# Patient Record
Sex: FEMALE | Race: WHITE | HISPANIC OR LATINO | Employment: FULL TIME | URBAN - METROPOLITAN AREA
[De-identification: names, ages, dates, MRNs, and addresses within clinical notes are randomized per-mention and may not be internally consistent; named-entity substitution may affect disease eponyms.]

---

## 2024-02-05 ENCOUNTER — NURSE TRIAGE (OUTPATIENT)
Dept: ADMINISTRATIVE | Facility: CLINIC | Age: 39
End: 2024-02-05
Payer: COMMERCIAL

## 2024-02-06 NOTE — TELEPHONE ENCOUNTER
"Spoke with pt who reports that she is 31 weeks pregnant, reports that while at work she noticed leakage of fluid from vagina. States that this incident occurred 2 times while at work today. Pt. head congestion, and that she is seeing floater. Took BP while on phone with NT JOSE ANGEL is 134/71. States she took BP prior to calling, and BP' were noted to be 144/77, then 129/70. Pt advised to be seen in L&D. Verbalized understanding      Reason for Disposition   [1] Pregnant 20 or more weeks AND [2] new blurred vision or vision changes    Additional Information   Negative: Difficult to awaken or acting confused (e.g., disoriented, slurred speech)   Negative: [1] Weakness of the face, arm or leg on one side of the body AND [2] new-onset   Negative: [1] Numbness of the face, arm or leg on one side of the body AND [2] new-onset   Negative: [1] Loss of speech or garbled speech AND [2] new-onset   Negative: Sounds like a life-threatening emergency to the triager   Negative: Unable to walk, or can only walk with assistance (e.g., requires support)   Negative: Severe pain in one eye   Negative: Stiff neck (can't touch chin to chest)   Negative: [1] Other family members (or people in same household) with headaches AND [2] possibility of carbon monoxide exposure   Negative: [1] SEVERE headache (e.g., excruciating) AND [2] having contractions or other symptoms of labor   Negative: [1] Pregnant 20 or more weeks AND [2] Systolic BP >= 140 OR Diastolic BP >= 90   Negative: [1] SEVERE headache (e.g., excruciating) AND [2] "worst headache" of life   Negative: [1] SEVERE headache AND [2] sudden-onset (i.e., reaching maximum intensity within seconds to 1 hour)   Negative: [1] SEVERE headache AND [2] fever   Negative: Loss of vision or double vision  (Exception: Similar to previous migraines.)   Negative: [1] Fever > 100.0 F (37.8 C) AND [2] diabetes mellitus or weak immune system (e.g., HIV positive, cancer chemo, splenectomy, organ " transplant, chronic steroids)   Negative: Patient sounds very sick or weak to the triager   Negative: [1] Pregnant 20 or more weeks AND [2] SEVERE headache (e.g., excruciating) AND [3] not improved after 2 hours of pain medicine   Negative: [1] Pregnant 20 or more weeks AND [2] new hand or face swelling   Negative: [1] Pregnant 20 or more weeks AND [2] sudden weight gain (e.g., more than 3 lbs or 1.4 kg in one week)    Protocols used: Pregnancy - Headache-A-AH

## 2024-02-12 ENCOUNTER — OFFICE VISIT (OUTPATIENT)
Dept: PEDIATRICS | Facility: CLINIC | Age: 39
End: 2024-02-12
Payer: COMMERCIAL

## 2024-02-12 DIAGNOSIS — Z76.81 COUNSELING OF EXPECTANT PARENTS AT PEDIATRIC PRE-BIRTH VISIT: Primary | ICD-10-CM

## 2024-02-12 PROCEDURE — 99499 UNLISTED E&M SERVICE: CPT | Mod: S$GLB,,, | Performed by: PEDIATRICS

## 2024-02-12 PROCEDURE — 99999 PR PBB SHADOW E&M-EST. PATIENT-LVL I: CPT | Mod: PBBFAC,,, | Performed by: PEDIATRICS

## 2024-03-22 ENCOUNTER — ON-DEMAND VIRTUAL (OUTPATIENT)
Dept: URGENT CARE | Facility: CLINIC | Age: 39
End: 2024-03-22
Payer: COMMERCIAL

## 2024-03-22 DIAGNOSIS — R03.0 ELEVATED BP WITHOUT DIAGNOSIS OF HYPERTENSION: Primary | ICD-10-CM

## 2024-03-22 PROCEDURE — 99202 OFFICE O/P NEW SF 15 MIN: CPT | Mod: 95,,, | Performed by: FAMILY MEDICINE

## 2024-03-23 NOTE — PROGRESS NOTES
Subjective:      Patient ID: Sharyn Pedroza is a 38 y.o. female.    Vitals:  vitals were not taken for this visit.     Chief Complaint: Hypertension      Visit Type: TELE AUDIOVISUAL    Present with the patient at the time of consultation: TELEMED PRESENT WITH PATIENT: None    No past medical history on file.  Past Surgical History:   Procedure Laterality Date    AUGMENTATION OF BREAST      MARSUPIALIZATION OF BARTHOLIN'S GLAND CYST      RHINOPLASTY       Review of patient's allergies indicates:  No Known Allergies  Current Outpatient Medications on File Prior to Visit   Medication Sig Dispense Refill    aspirin 81 MG Chew Take 81 mg by mouth once daily.      cetirizine (ZYRTEC) 10 mg Cap       fluticasone propionate (FLONASE) 50 mcg/actuation nasal spray 1 spray in each nostril Nasally Once a day for 14 day(s)      levothyroxine (SYNTHROID) 50 MCG tablet TAKE 1 TABLET BY MOUTH BEFORE BREAKFAST. 90 tablet 1    progesterone (PROMETRIUM) 200 MG capsule        No current facility-administered medications on file prior to visit.     Family History   Problem Relation Age of Onset    Hypertension Mother            Ohs Peq Odvv Intake    3/22/2024 11:09 PM CDT - Filed by Patient   What is your current physical address in the event of a medical emergency? [address] [place], [place]   Are you able to take your vital signs? Yes   Systolic Blood Pressure: 156   Diastolic Blood Pressure: 89   Weight: 170   Height: 62   Pulse:    Temperature: 98.9   Respiration rate:    Pulse Oxygen:    Please attach any relevant images or files          Had a c/s 2 days ago    Pre-ecclampsia symptoms    Higher @ home, farily normal     But was found to have protein in urine    Was @ 37 weeks    Given Pitocin, baby HR dipping    Patient went in for c/s    Prior to leaving  Felt CP and back pressure    Appeared BP was normal  ? Gas  Given Pepcid    Not much help, but wanted to go home    BP @ home 158/103            Constitution:  Negative for fever.   Cardiovascular:  Positive for chest pain.   Musculoskeletal:  Positive for back pain.        Objective:   The physical exam was conducted virtually.  Physical Exam   Constitutional: She is oriented to person, place, and time.   Pulmonary/Chest: Effort normal.   Neurological: She is alert and oriented to person, place, and time.       Assessment:     1. Elevated BP without diagnosis of hypertension    2. Pre-eclampsia in postpartum period        Plan:   Patient is post-op day 2 from c/s due to pre-ecclampsia and failure to progress  Patient went home after feeling symptomatic, and currently having elevated BP @ home  Concern she would not be able to be seen/re-admitted  [facility]vised [facility] [place]